# Patient Record
Sex: FEMALE | Race: WHITE | NOT HISPANIC OR LATINO | Employment: FULL TIME | URBAN - METROPOLITAN AREA
[De-identification: names, ages, dates, MRNs, and addresses within clinical notes are randomized per-mention and may not be internally consistent; named-entity substitution may affect disease eponyms.]

---

## 2022-06-05 ENCOUNTER — APPOINTMENT (OUTPATIENT)
Dept: GENERAL RADIOLOGY | Facility: HOSPITAL | Age: 62
End: 2022-06-05

## 2022-06-05 PROCEDURE — 73110 X-RAY EXAM OF WRIST: CPT | Performed by: FAMILY MEDICINE

## 2022-06-06 ENCOUNTER — TELEPHONE (OUTPATIENT)
Dept: ORTHOPEDIC SURGERY | Facility: CLINIC | Age: 62
End: 2022-06-06

## 2022-06-06 ENCOUNTER — OFFICE VISIT (OUTPATIENT)
Dept: ORTHOPEDIC SURGERY | Facility: CLINIC | Age: 62
End: 2022-06-06

## 2022-06-06 VITALS — WEIGHT: 178 LBS | TEMPERATURE: 97.7 F | BODY MASS INDEX: 28.61 KG/M2 | HEIGHT: 66 IN

## 2022-06-06 DIAGNOSIS — S52.202A UNSPECIFIED FRACTURE OF SHAFT OF LEFT ULNA, INITIAL ENCOUNTER FOR CLOSED FRACTURE: Primary | ICD-10-CM

## 2022-06-06 PROCEDURE — 99204 OFFICE O/P NEW MOD 45 MIN: CPT | Performed by: ORTHOPAEDIC SURGERY

## 2022-06-06 PROCEDURE — 25530 CLTX ULNAR SHFT FX W/O MNPJ: CPT | Performed by: ORTHOPAEDIC SURGERY

## 2022-06-06 NOTE — TELEPHONE ENCOUNTER
INTERNAL REFERRAL- closed fracture of distal end of left ulna- UC: 06/06/22- IMAGING: XR 06/06/22- CAN [facility] SEE SOON?

## 2022-06-06 NOTE — TELEPHONE ENCOUNTER
Okay to work in on Wednesday with me or today with [facility] around 330

## 2022-06-07 NOTE — PROGRESS NOTES
History & Physical       Patient: Cici Merritt    YOB: 1960    Medical Record Number: [id number]    Chief Complaints: Left wrist injury    History of Present Illness: 62 y.o. female presents for evaluation of the left wrist.  The injury was sustained in a fall about 4 days ago.  Initially, she thought she had sprained her wrist so she tried resting it and even wearing a brace that her  gave her.  The pain persisted and ended up going to an urgent care where she was diagnosed with a distal ulna fracture.  She was placed in a splint.  Her current pain is described as mild and throbbing.  She was given some hydrocodone but she has not yet filled that medicine.  She is right-hand dominant.  She reports good use and function of the left wrist prior to this injury.    Allergies: No Known Allergies    Home Medications:      Current Outpatient Medications:   •  chlorthalidone (HYGROTON) 25 MG tablet, Take 25 mg by mouth Daily., Disp: , Rfl:   •  HYDROcodone-acetaminophen (NORCO) 5-325 MG per tablet, Take 1 tablet by mouth Every 4 (Four) Hours As Needed for Severe Pain ., Disp: 18 tablet, Rfl: 0  •  potassium citrate (UROCIT-K) 10 MEQ (1080 MG) CR tablet, Take 10 mEq by mouth., Disp: , Rfl:   •  VITAMIN D PO, Take  by mouth Daily., Disp: , Rfl:     Past Medical History:   Diagnosis Date   • Kidney stone           Past Surgical History:   Procedure Laterality Date   • KIDNEY STONE SURGERY            Social History     Occupational History   • Not on file   Tobacco Use   • Smoking status: Not on file   • Smokeless tobacco: Never Used   Vaping Use   • Vaping Use: Never used   Substance and Sexual Activity   • Alcohol use: Yes   • Drug use: Not on file   • Sexual activity: Not on file      Social History     Social History Narrative   • Not on file        History reviewed. No pertinent family history.    Review of Systems:      Constitutional: Denies fever, shaking or chills   Eyes: Denies change in  "visual acuity   HEENT: Denies nasal congestion or sore throat   Respiratory: Denies cough or shortness of breath   Cardiovascular: Denies chest pain or edema  Endocrine: Denies tremors, palpitations, intolerance of heat or cold, polyuria, polydipsia.  GI: Denies abdominal pain, nausea, vomiting, bloody stools or diarrhea  : Denies frequency, urgency, incontinence, retention, or nocturia.  Musculoskeletal: Denies numbness tingling or loss of motor function except as above  Integument: Denies rash, lesion or ulceration   Neurologic: Denies headache or focal weakness, deficits  Heme: Denies epistaxis, spontaneous or excessive bleeding, epistaxis, hematuria, melena, fatigue, enlarged or tender lymph nodes.      All other pertinent positives and negatives as noted above in HPI.    Physical Exam: 62 y.o. female    Vitals:    06/06/22 1553   Temp: 97.7 °F (36.5 °C)   Weight: 80.7 kg (178 lb)   Height: 167.6 cm (66\")       General:  Patient is awake and alert.  Appears in no acute distress or discomfort.    Psych:  Affect and demeanor are appropriate.    Eyes:  Conjunctiva and sclera appear grossly normal.  Eyes track well and EOM seem to be intact.    Dentition:  No gross abnormalities noted.    Ears:  No gross abnormalities.  Hearing adequate for the exam.    Cardiovascular:  Regular rate and rhythm.    Lungs:  Good chest expansion.  Breathing unlabored.    Lymph:  No palpable masses or adenopathy in the affected extremity    Left upper extremity:  Splint was in place and removed.  She has some edema and ecchymosis overlying the distal ulna.  No deformity.  Skin appears benign.  No lacerations or abrasions.  Focal tenderness noted over the distal third of the ulna where I can palpate a subtle defect in the contour of the bone.  No tenderness over the radial side.  No tenderness in her hand or into her upper arm or elbow.  No palpable masses or adenopathy.  Compartments soft.  Painful, limited ROM of the wrist and " forearm.  Could not assess stability due to discomfort with motion.  Good strength in the hand with finger flexion and extension as well as thumb abduction.  Intact sensation.  Brisk cap refill.  Palpable radial pulse.     Diagnostic Tests:  No results found for: GLUCOSE, CALCIUM, NA, K, CO2, CL, BUN, CREATININE, EGFRIFAFRI, EGFRIFNONA, BCR, ANIONGAP  No results found for: WBC, HGB, HCT, MCV, PLT  No results found for: INR, PROTIME    Imaging:  Outside AP, oblique and lateral views of the left wrist are reviewed along with the associated report.  The x-rays show a distal third ulnar shaft fracture.  No significant angular deformity.  Slight translation measuring 2 mm on the lateral view.    Assessment:  Left distal ulna fracture    Plan:  I recommend closed treatment of this injury.  As long as we can keep this in its current alignment, I expect this should heal and she will do well.  Risks were discussed including nonunion, malunion, displacement necessitating operative intervention, arthrofibrosis, and persistent pain or motion loss necessitating further intervention.  I have recommended placement of a short arm cast.  A well-padded, well molded cast was placed today without complication.  She tolerated this procedure well.  We will reconvene in 7-10 days for repeat x-rays.  We discussed appropriate activity modifications and restrictions including no lifting greater than 2 pounds, pushing, or pulling with the affected arm.  I told her I will be happy to refill her pain medicine for her as needed.    Date: 6/7/2022    Alfonzo Hernandez MD

## 2022-06-15 ENCOUNTER — OFFICE VISIT (OUTPATIENT)
Dept: ORTHOPEDIC SURGERY | Facility: CLINIC | Age: 62
End: 2022-06-15

## 2022-06-15 VITALS — TEMPERATURE: 98 F | WEIGHT: 178 LBS | HEIGHT: 66 IN | BODY MASS INDEX: 28.61 KG/M2

## 2022-06-15 DIAGNOSIS — Z09 FRACTURE FOLLOW-UP: Primary | ICD-10-CM

## 2022-06-15 PROCEDURE — 99024 POSTOP FOLLOW-UP VISIT: CPT | Performed by: NURSE PRACTITIONER

## 2022-06-15 PROCEDURE — 73110 X-RAY EXAM OF WRIST: CPT | Performed by: NURSE PRACTITIONER

## 2022-06-15 NOTE — PROGRESS NOTES
Cici Merritt : 1960 MRN: [id number] DATE: 6/15/2022    CC: Closed treatment left distal ulna fracture    HPI: The patient returns to clinic today stating pain is improving.  No complaints.  Pain is well-controlled.      Vitals:    06/15/22 1406   Temp: 98 °F (36.7 °C)       Exam:  Cast in place.  Skin intact and benign about margins of cast.  Moves fingers well and without discomfort.  Good sensory function in the fingers and thumb.  Brisk cap refill      Imaging  2v xrays including AP and lateral views of the wrist are ordered and reviewed by me to evaluate alignment and for comparison purposes.  No new or concerning findings noted.  The fracture appears to have shifted slightly compared to previous films, but overall remains in good alignment.  unchanged.    Impression:  2 weeks s/p closed treatment left distal ulna fracture    Plan:  The fracture remains in good alignment.  Continue cast immobilization. Follow-up in 3 weeks with Dr. Hernandez for repeat x-rays and reevaluation.    ADRI Dubon     06/15/2022

## 2022-06-27 ENCOUNTER — TELEPHONE (OUTPATIENT)
Dept: ORTHOPEDIC SURGERY | Facility: CLINIC | Age: 62
End: 2022-06-27

## 2022-06-27 NOTE — TELEPHONE ENCOUNTER
Ms. Merritt returned my call.  I explained Dr. Hernandez has reviewed her left wrist x-rays.  He is concerned about the shift in the ulna, but feels if it will heal in this position everything should be fine.  He will take repeat x-rays to re-evaluate her fracture during their visit on 7/6.  She verbalized understanding.

## 2022-07-06 ENCOUNTER — OFFICE VISIT (OUTPATIENT)
Dept: ORTHOPEDIC SURGERY | Facility: CLINIC | Age: 62
End: 2022-07-06

## 2022-07-06 VITALS — HEIGHT: 66 IN | WEIGHT: 158 LBS | TEMPERATURE: 97.3 F | BODY MASS INDEX: 25.39 KG/M2

## 2022-07-06 DIAGNOSIS — Z09 FRACTURE FOLLOW-UP: Primary | ICD-10-CM

## 2022-07-06 PROCEDURE — 99024 POSTOP FOLLOW-UP VISIT: CPT | Performed by: ORTHOPAEDIC SURGERY

## 2022-07-06 PROCEDURE — 73110 X-RAY EXAM OF WRIST: CPT | Performed by: ORTHOPAEDIC SURGERY

## 2022-07-06 NOTE — PROGRESS NOTES
Mr. Merritt is now roughly a month out from closed treatment of her left ulna fracture.  She says that her pain is better.  She is still frustrated that she is still having some discomfort and swelling.  Again, she does admit that things are improved though.    Her cast fits well.  There is no skin breakdown around the margins.  She has full elbow motion.  Obviously she could not range the wrist but she moves her fingers well.  She pronates fully but supination is limited to just about 30 or 40 degrees.    AP and lateral views of the left forearm are ordered and reviewed to evaluate healing of the fracture.  These are compared to previous x-rays.  Her alignment looks about the same.  There is subtle angular deformity on the lateral view but it does not appear more than 10 degrees.  No significant shortening.  It does appear that there is early callus formation.    Assessment: 1 month status post closed treatment of left ulnar fracture    Plan: She demonstrates evidence for radiographic healing at this point.  Clinically, I think she is doing okay but she does need to start working more on supination.  I demonstrated some home exercises for her.  We again discussed appropriate activity modifications and restrictions.  I want to see her back in another 2 weeks and we will plan to take her cast off at that point.  May also consider a referral to OT at that point just depending on how well she is moving the forearm.    Alfonzo Hernandez MD

## 2022-07-25 ENCOUNTER — OFFICE VISIT (OUTPATIENT)
Dept: ORTHOPEDIC SURGERY | Facility: CLINIC | Age: 62
End: 2022-07-25

## 2022-07-25 VITALS — HEIGHT: 66 IN | BODY MASS INDEX: 25.39 KG/M2 | WEIGHT: 158 LBS | TEMPERATURE: 97.5 F

## 2022-07-25 DIAGNOSIS — Z09 FRACTURE FOLLOW-UP: ICD-10-CM

## 2022-07-25 DIAGNOSIS — S52.202A UNSPECIFIED FRACTURE OF SHAFT OF LEFT ULNA, INITIAL ENCOUNTER FOR CLOSED FRACTURE: Primary | ICD-10-CM

## 2022-07-25 PROCEDURE — 73110 X-RAY EXAM OF WRIST: CPT | Performed by: ORTHOPAEDIC SURGERY

## 2022-07-25 PROCEDURE — 99024 POSTOP FOLLOW-UP VISIT: CPT | Performed by: ORTHOPAEDIC SURGERY

## 2022-07-25 NOTE — PROGRESS NOTES
Ms. Merritt is now right at 6 weeks out from closed treatment of her left ulna fracture.  Pain is better but she is still having soreness.  She is frustrated that she is not further along.    Her cast fits well.  This was removed.  No skin breakdown underneath.  Contour of her forearm looks normal.  She is a little tender over the fracture site but there is palpable callus.  Supination is about 60 degrees today.  Wrist flexion and extension is for a total arc of motion of about 60 or 70 degrees.    AP, oblique and lateral views of the left wrist are ordered and reviewed evaluate healing of her fracture.  These are compared to previous x-rays.  There has been significant interval callus formation.  She does appear to have bridging bone forming on all cortices    Assessment: 6-week status post closed treatment left distal ulna fracture    Plan: Continue conservative treatment.  I put her in a brace.  I am going to have her start working on range of motion.  I want to see her back in another 6 weeks.  Appropriate activity modifications and restrictions were discussed.

## 2022-09-07 ENCOUNTER — OFFICE VISIT (OUTPATIENT)
Dept: ORTHOPEDIC SURGERY | Facility: CLINIC | Age: 62
End: 2022-09-07

## 2022-09-07 VITALS — BODY MASS INDEX: 25.67 KG/M2 | WEIGHT: 159.7 LBS | HEIGHT: 66 IN | TEMPERATURE: 97.7 F

## 2022-09-07 DIAGNOSIS — Z09 FRACTURE FOLLOW-UP: ICD-10-CM

## 2022-09-07 DIAGNOSIS — S52.202A UNSPECIFIED FRACTURE OF SHAFT OF LEFT ULNA, INITIAL ENCOUNTER FOR CLOSED FRACTURE: Primary | ICD-10-CM

## 2022-09-07 PROCEDURE — 73110 X-RAY EXAM OF WRIST: CPT | Performed by: ORTHOPAEDIC SURGERY

## 2022-09-07 PROCEDURE — 99212 OFFICE O/P EST SF 10 MIN: CPT | Performed by: ORTHOPAEDIC SURGERY

## 2022-09-07 NOTE — PROGRESS NOTES
CC:  Follow up left ulna fracture    Ms. Merritt is now 3 months out from closed treatment of her left ulnar fracture.  She says that her wrist is stiff but she does feel like the pain is better and she can tell that things are improving.  Denies any new concerns.    She has palpable callus over the fracture site.  No significant bony tenderness. She is lacking maybe 20 degrees of supination.  She can flex her wrist to about 20 degrees and dorsiflex about 30 degrees.  Overall, her wrist and forearm are both fairly stiff compared to the contralateral side.  She can push and pull against resistance with fairly good strength and only mild discomfort.    AP and lateral views left forearm are ordered and reviewed to evaluate healing of the fracture.  These compared to previous x-rays.  There appears to be bridging callus on all cortices    Assessment: 3-month status post closed treatment of left ulna fracture    Plan: He demonstrates evidence for both clinical and radiographic healing.  She can progress her activity as tolerated.  I demonstrated some home exercises for her to work on independently.  I have also referred her to therapy.  I told her it may take another 2 to 3 months for her to recover her motion.  I explained that her residual discomfort should gradually subside.  She can follow-up with me as needed going forward.    Alfonzo Hernandez MD

## 2022-10-12 ENCOUNTER — TELEPHONE (OUTPATIENT)
Dept: ORTHOPEDIC SURGERY | Facility: CLINIC | Age: 62
End: 2022-10-12